# Patient Record
Sex: FEMALE | Race: WHITE | Employment: OTHER | URBAN - METROPOLITAN AREA
[De-identification: names, ages, dates, MRNs, and addresses within clinical notes are randomized per-mention and may not be internally consistent; named-entity substitution may affect disease eponyms.]

---

## 2017-06-22 ENCOUNTER — ALLSCRIPTS OFFICE VISIT (OUTPATIENT)
Dept: OTHER | Facility: OTHER | Age: 57
End: 2017-06-22

## 2017-07-03 ENCOUNTER — GENERIC CONVERSION - ENCOUNTER (OUTPATIENT)
Dept: OTHER | Facility: OTHER | Age: 57
End: 2017-07-03

## 2017-07-03 ENCOUNTER — LAB CONVERSION - ENCOUNTER (OUTPATIENT)
Dept: OTHER | Facility: OTHER | Age: 57
End: 2017-07-03

## 2017-07-03 LAB
25(OH)D3 SERPL-MCNC: 33 NG/ML (ref 30–100)
A/G RATIO (HISTORICAL): 1.5 (CALC) (ref 1–2.5)
ALBUMIN SERPL BCP-MCNC: 4 G/DL (ref 3.6–5.1)
ALP SERPL-CCNC: 60 U/L (ref 33–130)
ALT SERPL W P-5'-P-CCNC: 13 U/L (ref 6–29)
AST SERPL W P-5'-P-CCNC: 16 U/L (ref 10–35)
BASOPHILS # BLD AUTO: 0.6 %
BASOPHILS # BLD AUTO: 41 CELLS/UL (ref 0–200)
BILIRUB SERPL-MCNC: 0.5 MG/DL (ref 0.2–1.2)
BUN SERPL-MCNC: 22 MG/DL (ref 7–25)
BUN/CREA RATIO (HISTORICAL): ABNORMAL (CALC) (ref 6–22)
CALCIUM SERPL-MCNC: 9 MG/DL (ref 8.6–10.4)
CHLORIDE SERPL-SCNC: 102 MMOL/L (ref 98–110)
CHOLEST SERPL-MCNC: 180 MG/DL (ref 125–200)
CHOLEST/HDLC SERPL: 3.1 (CALC)
CO2 SERPL-SCNC: 25 MMOL/L (ref 20–31)
CREAT SERPL-MCNC: 0.95 MG/DL (ref 0.5–1.05)
DEPRECATED RDW RBC AUTO: 15.2 % (ref 11–15)
EGFR AFRICAN AMERICAN (HISTORICAL): 78 ML/MIN/1.73M2
EGFR-AMERICAN CALC (HISTORICAL): 67 ML/MIN/1.73M2
EOSINOPHIL # BLD AUTO: 13.6 %
EOSINOPHIL # BLD AUTO: 925 CELLS/UL (ref 15–500)
FSH (HISTORICAL): 123.9 MIU/ML
GAMMA GLOBULIN (HISTORICAL): 2.7 G/DL (CALC) (ref 1.9–3.7)
GLUCOSE (HISTORICAL): 100 MG/DL (ref 65–99)
HCT VFR BLD AUTO: 36.4 % (ref 35–45)
HDLC SERPL-MCNC: 58 MG/DL
HGB BLD-MCNC: 11.9 G/DL (ref 11.7–15.5)
LDL CHOLESTEROL (HISTORICAL): 107 MG/DL (CALC)
LUTEINIZING HORMONE (HISTORICAL): 69.4 MIU/ML
LYME IGG/IGM AB (HISTORICAL): <0.9 INDEX
LYMPHOCYTES # BLD AUTO: 2258 CELLS/UL (ref 850–3900)
LYMPHOCYTES # BLD AUTO: 33.2 %
MCH RBC QN AUTO: 28.7 PG (ref 27–33)
MCHC RBC AUTO-ENTMCNC: 32.6 G/DL (ref 32–36)
MCV RBC AUTO: 87.9 FL (ref 80–100)
MONOCYTES # BLD AUTO: 449 CELLS/UL (ref 200–950)
MONOCYTES (HISTORICAL): 6.6 %
NEUTROPHILS # BLD AUTO: 3128 CELLS/UL (ref 1500–7800)
NEUTROPHILS # BLD AUTO: 46 %
NON-HDL-CHOL (CHOL-HDL) (HISTORICAL): 122 MG/DL (CALC)
PLATELET # BLD AUTO: 280 THOUSAND/UL (ref 140–400)
PMV BLD AUTO: 8.1 FL (ref 7.5–12.5)
POTASSIUM SERPL-SCNC: 4.2 MMOL/L (ref 3.5–5.3)
RBC # BLD AUTO: 4.15 MILLION/UL (ref 3.8–5.1)
SODIUM SERPL-SCNC: 139 MMOL/L (ref 135–146)
T4 FREE SERPL-MCNC: 0.9 NG/DL (ref 0.8–1.8)
TOTAL PROTEIN (HISTORICAL): 6.7 G/DL (ref 6.1–8.1)
TRIGL SERPL-MCNC: 75 MG/DL
TSH SERPL DL<=0.05 MIU/L-ACNC: 9.16 MIU/L (ref 0.4–4.5)
WBC # BLD AUTO: 6.8 THOUSAND/UL (ref 3.8–10.8)

## 2017-12-14 ENCOUNTER — ALLSCRIPTS OFFICE VISIT (OUTPATIENT)
Dept: OTHER | Facility: OTHER | Age: 57
End: 2017-12-14

## 2017-12-15 NOTE — PROGRESS NOTES
Assessment  1  Hypothyroidism (244 9) (E03 9)   2  Adult BMI 27 0-27 9 kg/sq m (V85 23) (Z68 27)   3  Thumb pain, left (729 5) (O73 350)    Plan  Hypothyroidism    · Levothyroxine Sodium 25 MCG Oral Tablet (Synthroid); TAKE ONE TABLET(S)DAILY take on anempty stomach   · (1) TSH WITH FT4 REFLEX; Status:Active; Requested for:42Fbg5915; Thumb pain, left    · 1 - Ana María Abreu MD  (Orthopedic Surgery) Co-Management  *  Status: Active Requested for: [id number]  Care Summary provided  : Yes    Discussion/Summary    Check labs for hypothyroidism control  Referred to ortho for thumb issue  Follow up yearly or sooner PRN  Chief Complaint  pt present for follow up on medication  ac/cma      History of Present Illness  Here for hypothyroidism follow up  Feels well, no fatigue, weight gain, or other symptoms  Having some L thumb pain and inability to flex  No injury  Noted suddenly about 2 weeks ago while in bed  The patient is being seen for follow-up of hypothyroidism of undetermined etiology  The patient reports doing well  She has had no significant interval events  The patient is currently asymptomatic  Medications:  the patient is adherent to her medication regimen, but-- she denies medication side effects  Disease management:  the patient is doing well with her goals  Due for: thyroid stimulating hormone and free T4  Review of Systems   Constitutional: No fever, no chills, feels well, no tiredness, no recent weight gain or weight loss  Cardiovascular: No complaints of slow heart rate, no fast heart rate, no chest pain, no palpitations, no leg claudication, no lower extremity edema  Respiratory: No complaints of shortness of breath, no wheezing, no cough, no SOB on exertion, no orthopnea, no PND  Musculoskeletal: as noted in HPI  Active Problems  1  Elevated glucose (790 29) (R73 09)   2  Fatigue (780 79) (R53 83)   3  H/o Lyme disease (V12 09) (Z86 19)   4   Hashimoto's thyroiditis (245 2) (E06 3)   5  Hypothyroidism (244 9) (E03 9)   6  Lipid screening (V77 91) (Z13 220)   7  Menopausal disorder (627 9) (N95 9)   8  Muscle pain (729 1) (M79 1)   9  Need for tetanus booster (V03 7) (Z23)   10  Pulmonary embolism (415 19) (I26 99)   11  Screening for diabetes mellitus (DM) (V77 1) (Z13 1)   12  Visit for screening mammogram (V76 12) (Z12 31)   13  Vitamin D deficiency (268 9) (E55 9)    Past Medical History  1  Pulmonary embolism (415 19) (I26 99)    The active problems and past medical history were reviewed and updated today  Surgical History  1  History of Gynecologic Serv Endometrial Cryoablation W/ Ultrason Guid   2  History of Sinus Surgery    The surgical history was reviewed and updated today  Family History  Mother    1  Family history of Diabetes  Brother    2  Family history of juvenile rheumatoid arthritis (V17 7) (Z82 61)   3  Family history of Parkinson's disease (V17 2) (Z82 0)    The family history was reviewed and updated today  Social History   · Former smoker (O77 00) (M25 805)  The social history was reviewed and updated today  The social history was reviewed and is unchanged  Current Meds   1  Levothyroxine Sodium 25 MCG Oral Tablet; TAKE ONE TABLET(S) DAILY take on anempty stomach; Therapy: 06PPT3014 to (Evaluate:66Xwr6640)  Requested for: 66NJZ9007; Last Rx:16Kbo5222 Ordered    The medication list was reviewed and updated today  Allergies  1  Percocet TABS    Vitals  Vital Signs    Recorded: 68Ejc4033 12:04PM   Temperature 97 1 F   Heart Rate 76   Respiration 16   Systolic 232   Diastolic 68   Height 5 ft 3 in   Weight 157 lb    BMI Calculated 27 81   BSA Calculated 1 74     Physical Exam   Constitutional  General appearance: No acute distress, well appearing and well nourished  Pulmonary  Respiratory effort: No increased work of breathing or signs of respiratory distress  Auscultation of lungs: Clear to auscultation     Cardiovascular  Auscultation of heart: Normal rate and rhythm, normal S1 and S2, without murmurs  Examination of extremities for edema and/or varicosities: Normal    Abdomen  Abdomen: Non-tender, no masses  Liver and spleen: No hepatomegaly or splenomegaly  Lymphatic  Palpation of lymph nodes in neck: No lymphadenopathy  Musculoskeletal  Inspection/palpation of joints, bones, and muscles: Normal  -- L thumb able to passively flex but no active ability to flex    Psychiatric  Orientation to person, place, and time: Normal    Mood and affect: Normal          Signatures   Electronically signed by : Angelica Snellen, M D ; Dec 14 2017  1:09PM EST                       (Author)

## 2018-01-10 NOTE — RESULT NOTES
Verified Results  (1) HEMOGLOBIN A1C 29Jan2016 07:31AM Alba Briones Stage     Test Name Result Flag Reference   Hemoglobin A1c 6 6 % H 4 8-5 6   Pre-diabetes: 5 7 - 6 4           Diabetes: >6 4           Glycemic control for adults with diabetes: <7 0       Discussion/Summary   Glucose is high    Please schedule an appointment to discuss  - Dr Rojelio Rosales

## 2018-01-12 VITALS
DIASTOLIC BLOOD PRESSURE: 70 MMHG | SYSTOLIC BLOOD PRESSURE: 110 MMHG | HEIGHT: 63 IN | TEMPERATURE: 98.2 F | BODY MASS INDEX: 26.75 KG/M2 | RESPIRATION RATE: 20 BRPM | HEART RATE: 82 BPM | WEIGHT: 151 LBS

## 2018-01-13 NOTE — RESULT NOTES
Verified Results  [facility]) TSH+Free T4 [id number] 07:31AM Marie Bhandari     Test Name Result Flag Reference   TSH 2 060 uIU/mL  0 450-4 500   T4,Free(Direct) 1 25 ng/dL  0 82-1 77     (LC) Vitamin D, 25-Hydroxy, Total [id number] 07:31AM Abdi Briones     Test Name Result Flag Reference   Vitamin D, 25-Hydroxy, Serum 34 ng/mL     Reference Range: All Ages: Target levels 30 - 100     (LC) Comp  Metabolic Panel (13) [id number] 07:31AM Abdi Briones     Test Name Result Flag Reference   Glucose, Serum 119 mg/dL H 65-99   BUN 18 mg/dL  6-24   Creatinine, Serum 0 97 mg/dL  0 57-1 00   eGFR If NonAfricn Am 66 mL/min/1 73  >59   eGFR If Africn Am 77 mL/min/1 73  >59   BUN/Creatinine Ratio 19  9-23   Sodium, Serum 140 mmol/L  134-144   Potassium, Serum 4 2 mmol/L  3 5-5 2   Chloride, Serum 101 mmol/L     Carbon Dioxide, Total 23 mmol/L  18-29   Calcium, Serum 9 6 mg/dL  8 7-10 2   Protein, Total, Serum 6 9 g/dL  6 0-8 5   Albumin, Serum 4 4 g/dL  3 5-5 5   Globulin, Total 2 5 g/dL  1 5-4 5   A/G Ratio 1 8  1 1-2 5   Bilirubin, Total 0 4 mg/dL  0 0-1 2   Alkaline Phosphatase, S 53 IU/L     AST (SGOT) 19 IU/L  0-40     () CBC/Diff Ambiguous Default [id number] 07:31AM Marie Bhandari     Test Name Result Flag Reference   WBC 5 8 x10E3/uL  3 4-10 8   RBC 4 60 x10E6/uL  3 77-5 28   Hemoglobin 13 7 g/dL  11 1-15 9   Hematocrit 40 6 %  34 0-46  6   MCV 88 fL  79-97   MCH 29 8 pg  26 6-33 0   MCHC 33 7 g/dL  31 5-35 7   RDW 13 4 %  12 3-15 4   Platelets 329 P11J6/BV  150-379   Neutrophils 48 %     Lymphs 33 %     Monocytes 7 %     Eos 11 %     Basos 1 %     Neutrophils (Absolute) 2 8 x10E3/uL  1 4-7 0   Lymphs (Absolute) 1 9 x10E3/uL  0 7-3 1   Monocytes(Absolute) 0 4 x10E3/uL  0 1-0 9   Eos (Absolute) 0 7 x10E3/uL H 0 0-0 4   Baso (Absolute) 0 1 x10E3/uL  0 0-0 2   Immature Granulocytes 0 %     Immature Grans (Abs) 0 0 x10E3/uL  0 0-0 1     (LC) Lipid Panel [id number] 07:31AM Marie Bhandari     Test Name Result Flag Reference   Cholesterol, Total 173 mg/dL  100-199   Triglycerides 64 mg/dL  0-149   HDL Cholesterol 70 mg/dL  >39   According to ATP-III Guidelines, HDL-C >59 mg/dL is considered a  negative risk factor for CHD  VLDL Cholesterol Cong 13 mg/dL  5-40   LDL Cholesterol Calc 90 mg/dL  0-99     [facility]) Cardiovascular Risk Assessment 29Jan2016 07:31AM Remacarena Terrence     Test Name Result Flag Reference   Interpretation Note     Medical Director's Note: Patient First Name has been corrected on  1/30/2016, was [facility] and now is S[facility]  Please review this report  in its entirety, since changes to patient demographics may affect  result interpretation(s) and/or treatment/follow-up suggestions  Supplement report is available  PDF Image   Plan  Elevated glucose    · (LC) Hemoglobin A1c; Status:Active;  Requested for:26Equ5942;

## 2018-01-15 NOTE — PROGRESS NOTES
Assessment    1  Encounter for preventive health examination (V70 0) (Z00 00)    Plan  Fatigue    · (LC) CBC/Diff Ambiguous Default; Status:Active; Requested FMR:[id number];   Hashimoto's thyroiditis    · (LC) TSH+Free T4; Status:Active; Requested SRP:[id number]; Health Maintenance    · (LC) Comp  Metabolic Panel (13); Status:Active; Requested BCT:[id number];    · SNELLEN VISION- POC; Status:Temporary Deferral - Patient reports item recently done;   Lipid screening    · ([facility]) Lipid Panel; Status:Active; Requested KWAME:95UOG2360;   Need for tetanus booster    · Adacel 5-2-15 5 LF-MCG/0 5 Intramuscular Suspension; 0 5 ml IM x 1 dose; To Be Done: 21Jan2016  Vitamin D deficiency    · (LC) Vitamin D, 25-Hydroxy, Total; Status:Active; Requested DTP:14NVD6818;     Discussion/Summary  health maintenance visit Currently, she eats a healthy diet and has an adequate exercise regimen  cervical cancer screening is current cervical cancer screening is needed every three years cervical cancer screening is managed by gyn Breast cancer screening: the risks and benefits of breast cancer screening were discussed and mammogram is current  Colorectal cancer screening: colorectal cancer screening is current  The immunizations will be given as outlined in the orders  Advice and education were given regarding nutrition, aerobic exercise, calcium supplements and vitamin D supplements  Patient discussion: discussed with the patient  Chief Complaint  CPE - needs a Tdap because her daughter is expecting a baby in 4 weeks  Patient declined a vision screen - states she was just to her eye dr on Monday  acgCMA      History of Present Illness  HM, Adult Female:   General Health: She has regular dental visits  She denies vision problems  She denies hearing loss  Immunizations status: not up to date  Lifestyle:  She consumes a diverse and healthy diet  She does not have any weight concerns  She exercises regularly   (walks at least 30 minutes daily)   She does not use tobacco  She denies alcohol use  She denies drug use  Reproductive health: the patient is postmenopausal    Screening: cancer screening reviewed and updated  metabolic screening reviewed and updated  risk screening reviewed and updated  Review of Systems    Constitutional: No fever, no chills, feels well, no tiredness, no recent weight gain or weight loss  Eyes: No complaints of eye pain, no red eyes, no eyesight problems, no discharge, no dry eyes, no itching of eyes  ENT: no complaints of earache, no loss of hearing, no nose bleeds, no nasal discharge, no sore throat, no hoarseness  Cardiovascular: No complaints of slow heart rate, no fast heart rate, no chest pain, no palpitations, no leg claudication, no lower extremity edema  Respiratory: No complaints of shortness of breath, no wheezing, no cough, no SOB on exertion, no orthopnea, no PND  Gastrointestinal: No complaints of abdominal pain, no constipation, no nausea or vomiting, no diarrhea, no bloody stools  Genitourinary: No complaints of dysuria, no incontinence, no pelvic pain, no dysmenorrhea, no vaginal discharge or bleeding  Musculoskeletal: No complaints of arthralgias, no myalgias, no joint swelling or stiffness, no limb pain or swelling  Integumentary: No complaints of skin rash or lesions, no itching, no skin wounds, no breast pain or lump  Neurological: No complaints of headache, no confusion, no convulsions, no numbness, no dizziness or fainting, no tingling, no limb weakness, no difficulty walking  Psychiatric: Not suicidal, no sleep disturbance, no anxiety or depression, no change in personality, no emotional problems  Endocrine: No complaints of proptosis, no hot flashes, no muscle weakness, no deepening of the voice, no feelings of weakness  Hematologic/Lymphatic: No complaints of swollen glands, no swollen glands in the neck, does not bleed easily, does not bruise easily        Active Problems    1  Fatigue (780 79) (R53 83)   2  H/O Lyme disease (V12 09) (Z86 19)   3  Hashimoto's thyroiditis (245 2) (E06 3)   4  Joint pain (719 40) (M25 50)   5  Muscle pain (729 1) (M79 1)   6  Neck pain (723 1) (M54 2)   7  Pulmonary embolism (415 19) (I26 99)   8  Screening for diabetes mellitus (DM) (V77 1) (Z13 1)   9  Visit for screening mammogram (V76 12) (Z12 31)   10  Vitamin D deficiency (268 9) (E55 9)    Past Medical History    · Pulmonary embolism (415 19) (I26 99)    Surgical History    · History of Gynecologic Serv Endometrial Cryoablation W/ Ultrason Guid   · History of Sinus Surgery    Family History    · Family history of Diabetes    · Family history of juvenile rheumatoid arthritis (V17 7) (Z82 61)   · Family history of Parkinson's disease (V17 2) (Z82 0)    Social History    · Former smoker (V15 82) (G78 029)    Current Meds   1  CoQ-10 200 MG Oral Capsule; TAKE 1 CAPSULE Daily Recorded    Allergies    1  Percocet TABS    Vitals   Recorded: 21Jan2016 04:05PM   Temperature 98 F   Heart Rate 84   Respiration 16   Systolic 770   Diastolic 70   Height 5 ft 3 in   Weight 155 lb    BMI Calculated 27 46   BSA Calculated 1 74     Physical Exam    Constitutional   General appearance: No acute distress, well appearing and well nourished  Eyes   Conjunctiva and lids: No swelling, erythema or discharge  Pupils and irises: Equal, round, reactive to light  Ears, Nose, Mouth, and Throat   External inspection of ears and nose: Normal     Otoscopic examination: Tympanic membranes translucent with normal light reflex  Canals patent without erythema  Hearing: Normal     Nasal mucosa, septum, and turbinates: Normal without edema or erythema  Lips, teeth, and gums: Normal, good dentition  Oropharynx: Normal with no erythema, edema, exudate or lesions  Neck   Neck: Supple, symmetric, trachea midline, no masses  Thyroid: Normal, no thyromegaly      Pulmonary   Respiratory effort: No increased work of breathing or signs of respiratory distress  Percussion of chest: Normal     Palpation of chest: Normal     Auscultation of lungs: Clear to auscultation  Cardiovascular   Palpation of heart: Normal PMI, no thrills  Auscultation of heart: Normal rate and rhythm, normal S1 and S2, no murmurs  Carotid pulses: 2+ bilaterally  Abdominal aorta: Normal     Femoral pulses: 2+ bilaterally  Pedal pulses: 2+ bilaterally  Examination of extremities for edema and/or varicosities: Normal     Abdomen   Abdomen: Non-tender, no masses  Liver and spleen: No hepatomegaly or splenomegaly  Examination for hernias: No hernia appreciated  Lymphatic   Palpation of lymph nodes in neck: No lymphadenopathy  Palpation of lymph nodes in axillae: No lymphadenopathy  Palpation of lymph nodes in groin: No lymphadenopathy  Palpation of lymph nodes in other areas: No lymphadenopathy  Musculoskeletal   Gait and station: Normal     Digits and nails: Normal without clubbing or cyanosis  Joints, bones, and muscles: Normal     Range of motion: Normal     Stability: Normal     Muscle strength/tone: Normal     Skin   Skin and subcutaneous tissue: Normal without rashes or lesions  Palpation of skin and subcutaneous tissue: Normal turgor  Neurologic   Cranial nerves: Cranial nerves II-XII intact  Reflexes: 2+ and symmetric  Sensation: No sensory loss  Psychiatric   Judgment and insight: Normal     Orientation to person, place, and time: Normal     Recent and remote memory: Intact  Mood and affect: Normal        Procedure    Procedure: Visual Acuity Test   pt declines - she was to her eye dr on Monday of this week  acgCMA  Indication: routine screening        Signatures   Electronically signed by : TIM Paris ; Jan 21 2016  4:31PM EST                       (Author)

## 2018-01-16 NOTE — RESULT NOTES
Verified Results  (1) TSH WITH FT4 REFLEX [id number] 09:15AM Corinan Briones     Test Name Result Flag Reference   TSH 3 560 uIU/mL  0 450-4 500     (1) VITAMIN D 25-HYDROXY [id number] 09:15AM Corinna Briones     Test Name Result Flag Reference   Vitamin D, 25-Hydroxy 36 5 ng/mL  30 0-100 0   Vitamin D deficiency has been defined by the [address] practice guideline as a  level of serum 25-OH vitamin D less than 20 ng/mL (1,2)  The Endocrine Society went on to further define vitamin D  insufficiency as a level between 21 and 29 ng/mL (2)  1  IOM ([employer])  2010  Dietary reference     intakes for calcium and D  [facility]: The     [employer]  2  Zunilda MF, Lennox GERARD, Ghislaine HO, et al      Evaluation, treatment, and prevention of vitamin D     deficiency: an Endocrine Society clinical practice     guideline  [facility]  2011 Jul; 96(7):1911-30  (1) CBC/PLT/DIFF [id number] 09:15AM Corinna Briones     Test Name Result Flag Reference   Lymphs (Absolute) 2 2 x10E3/uL  0 7-3 1   Monocytes(Absolute) 0 5 x10E3/uL  0 1-0 9   Eos (Absolute) 1 1 x10E3/uL H 0 0-0 4   Baso (Absolute) 0 0 x10E3/uL  0 0-0 2   Immature Granulocytes 0 %     Immature Grans (Abs) 0 0 x10E3/uL  0 0-0 1   Neutrophils 43 %     Lymphs 32 %     Monocytes 8 %     Eos 16 %     Basos 1 %     Neutrophils (Absolute) 3 0 x10E3/uL  1 4-7 0   Hematocrit 39 4 %  34 0-46  6   MCV 88 fL  79-97   MCH 29 1 pg  26 6-33 0   MCHC 33 0 g/dL  31 5-35 7   RDW 13 6 %  12 3-15 4   Platelets 737 R03S4/HN  150-379   WBC 6 9 x10E3/uL  3 4-10 8   RBC 4 46 x10E6/uL  3 77-5 28   Hemoglobin 13 0 g/dL  11 1-15 9     (1) COMPREHENSIVE METABOLIC PANEL [id number] 25:68HI Corinna Briones     Test Name Result Flag Reference   Glucose, Serum 112 mg/dL H 65-99   BUN 16 mg/dL  6-24   Creatinine, Serum 0 96 mg/dL  0 57-1 00   eGFR If NonAfricn Am 67 mL/min/1 73  >59   eGFR If Africn Am 77 mL/min/1 73  >59 BUN/Creatinine Ratio 17  9-23   ALT (SGPT) 13 IU/L  0-32   Albumin, Serum 4 2 g/dL  3 5-5 5   Globulin, Total 2 7 g/dL  1 5-4 5   A/G Ratio 1 6  1 1-2 5   Bilirubin, Total 0 4 mg/dL  0 0-1 2   Alkaline Phosphatase, S 61 IU/L     AST (SGOT) 18 IU/L  0-40   Sodium, Serum 142 mmol/L  134-144   Potassium, Serum 4 3 mmol/L  3 5-5 2   Chloride, Serum 103 mmol/L     Carbon Dioxide, Total 25 mmol/L  18-29   Calcium, Serum 9 1 mg/dL  8 7-10 2   Protein, Total, Serum 6 9 g/dL  6 0-8 5     (1) LIPID PANEL, FASTING 28Sep2016 09:15AM Abdi Briones     Test Name Result Flag Reference   Cholesterol, Total 181 mg/dL  100-199   Triglycerides 89 mg/dL  0-149   HDL Cholesterol 69 mg/dL  >39   According to ATP-III Guidelines, HDL-C >59 mg/dL is considered a  negative risk factor for CHD  VLDL Cholesterol Cong 18 mg/dL  5-40   LDL Cholesterol Calc 94 mg/dL  0-99   T  Chol/HDL Ratio 2 6 ratio units  0 0-4 4   T  Chol/HDL Ratio                                                             Men  Women                                               1/2 Avg  Risk  3 4    3 3                                                   Avg Risk  5 0    4 4                                                2X Avg  Risk  9 6    7 1                                                3X Avg  Risk 23 4   11 0     (1) HEMOGLOBIN A1C 28Sep2016 09:15AM Abdi Briones     Test Name Result Flag Reference   Hemoglobin A1c 5 9 % H 4 8-5 6   Pre-diabetes: 5 7 - 6 4           Diabetes: >6 4           Glycemic control for adults with diabetes: <7 0     [facility]) Cardiovascular Risk Assessment 28Sep2016 09:15AM Marie Thony     Test Name Result Flag Reference   Interpretation Note     Supplement report is available  PDF Image          ([birth year][address] [facility]) Lyme Ab/Western Blot Reflex [id number] 09:15AM Marie Thony     Test Name Result Flag Reference   Lyme IgG/IgM Ab <0 91 ISR  0 00-0 90   Negative         <0 91                                                 Equivocal  0 91 - 1 09                                                 Positive         >1 09   Lyme Disease Ab, Quant, IgM 0 96 index H 0 00-0 79   Negative         <0 80                                                 Equivocal  0 80 - 1 19                                                 Positive         >1 19                  IgM levels may peak at 3-6 weeks post infection, then                  gradually decline  IgG P93 Ab  Absent     IgG P66 Ab  Present A    IgG P58 Ab  Absent     IgG P18 Ab  Absent     Lyme IgG WB Interp  Negative     Positive: 5 of the following                                                Borrelia-specific bands:                                                18,23,28,30,39,41,45,58,                                                66, and 93  Negative: No bands or banding                                                patterns which do not                                                meet positive criteria  IgM P41 Ab  Absent     IgM P39 Ab  Absent     IgM P23 Ab  Absent     Lyme IgM WB Interp  Negative     Note: An equivocal or positive EIA result followed by a negative  Western Blot result is considered NEGATIVE  An equivocal or positive  EIA result followed by a positive Western Blot is considered POSITIVE  by the CDC  Positive: 2 of the following bands: 23,39 or 41  Negative: No bands or banding patterns which do not meet positive  criteria  Criteria for positivity are those recommended by CDC/ASTPHLD   p23=Osp C, d34=vwzlmuxte  Note:  Sera from individuals with the following may cross react in the  Lyme Western Blot assays: other spirochetal diseases (periodontal  disease, leptospirosis, relapsing fever, yaws, and pinta);  connective autoimmune (Rheumatoid Arthritis and Systemic Lupus  Erythematosus and also individuals with Antinuclear Antibody);  other infections COFFEE [facility] Spotted Fever; Mayuri-Barr Virus,  and Cytomegalovirus)  IgG P45 Ab   Absent IgG P41 Ab  Absent     IgG P39 Ab  Absent     IgG P30 Ab  Absent     IgG P28 Ab  Absent     IgG P23 Ab   Absent         Discussion/Summary   Sugar is mildly elevated but other bloodwork, including lyme titer, is normal  - Dr Colleen Hammer

## 2018-01-23 VITALS
SYSTOLIC BLOOD PRESSURE: 112 MMHG | WEIGHT: 157 LBS | DIASTOLIC BLOOD PRESSURE: 68 MMHG | HEART RATE: 76 BPM | RESPIRATION RATE: 16 BRPM | HEIGHT: 63 IN | TEMPERATURE: 97.1 F | BODY MASS INDEX: 27.82 KG/M2

## 2018-02-01 LAB — TSH SERPL-ACNC: 3.1 MIU/L (ref 0.4–4.5)

## 2018-02-05 DIAGNOSIS — E03.9 HYPOTHYROIDISM, UNSPECIFIED TYPE: Primary | ICD-10-CM

## 2018-02-05 RX ORDER — LEVOTHYROXINE SODIUM 0.03 MG/1
25 TABLET ORAL DAILY
Qty: 90 TABLET | Refills: 3 | Status: SHIPPED | OUTPATIENT
Start: 2018-02-05 | End: 2019-01-08 | Stop reason: SDUPTHER

## 2018-02-05 RX ORDER — LEVOTHYROXINE SODIUM 0.03 MG/1
1 TABLET ORAL DAILY
COMMUNITY
Start: 2017-07-03 | End: 2018-02-05 | Stop reason: SDUPTHER

## 2018-02-05 NOTE — TELEPHONE ENCOUNTER
Alpesh Holcomb is calling regarding her blood work that she received in the mail  She has a couple of questions

## 2018-02-05 NOTE — TELEPHONE ENCOUNTER
Spoke with pt  She wanted to know if she should continue taking the same dose on her Levothyroxine  As per Dr Santiago Xie advise, pt aware to continue taking same dose  Refill request sent to you for mail order

## 2018-04-10 ENCOUNTER — OFFICE VISIT (OUTPATIENT)
Dept: FAMILY MEDICINE CLINIC | Facility: CLINIC | Age: 58
End: 2018-04-10
Payer: COMMERCIAL

## 2018-04-10 VITALS
TEMPERATURE: 97.4 F | DIASTOLIC BLOOD PRESSURE: 58 MMHG | RESPIRATION RATE: 16 BRPM | HEART RATE: 84 BPM | HEIGHT: 63 IN | WEIGHT: 161 LBS | SYSTOLIC BLOOD PRESSURE: 114 MMHG | BODY MASS INDEX: 28.53 KG/M2

## 2018-04-10 DIAGNOSIS — J06.9 VIRAL URI WITH COUGH: Primary | ICD-10-CM

## 2018-04-10 PROCEDURE — 3008F BODY MASS INDEX DOCD: CPT | Performed by: NURSE PRACTITIONER

## 2018-04-10 PROCEDURE — 99213 OFFICE O/P EST LOW 20 MIN: CPT | Performed by: NURSE PRACTITIONER

## 2018-04-10 RX ORDER — BENZONATATE 200 MG/1
200 CAPSULE ORAL 3 TIMES DAILY PRN
Qty: 30 CAPSULE | Refills: 0 | Status: SHIPPED | OUTPATIENT
Start: 2018-04-10 | End: 2019-03-14 | Stop reason: ALTCHOICE

## 2018-04-10 NOTE — PROGRESS NOTES
Assessment/Plan:    Problem List Items Addressed This Visit     None      Visit Diagnoses     Viral URI with cough    -  Primary    Relevant Medications    benzonatate (TESSALON) 200 MG capsule          Patient Instructions   Advised on supportive care  Follow up for fevers, worsening cough, or difficulty breathing  Return if symptoms worsen or fail to improve  Subjective:      Patient ID: Alicia Lanier is a 63 y o  female  Chief Complaint   Patient presents with    Cough     Started 10 days ago  Fever initially JMoyle LPN    Earache    Sinus Problem       10 days ago she developed body aches, sinus congestion, dry cough, fever, and red eyes with discharge  Overall symptoms have resolved  She has a residual dry, hacking cough  She is not taking anything OTC for this  She did not have a flu shot this season   developed similar symptoms 4 days ago  The following portions of the patient's history were reviewed and updated as appropriate: allergies, current medications, past family history, past medical history, past social history, past surgical history and problem list     Review of Systems   Constitutional: Negative for chills, fatigue and fever  HENT: Negative for congestion, ear pain, postnasal drip, rhinorrhea, sinus pressure and sore throat  Respiratory: Positive for cough  Negative for shortness of breath and wheezing  Cardiovascular: Negative for chest pain  Gastrointestinal: Negative for abdominal pain, diarrhea, nausea and vomiting  Musculoskeletal: Negative for arthralgias  Skin: Negative for rash  Neurological: Negative for headaches           Current Outpatient Prescriptions   Medication Sig Dispense Refill    levothyroxine 25 mcg tablet Take 1 tablet (25 mcg total) by mouth daily 90 tablet 3    benzonatate (TESSALON) 200 MG capsule Take 1 capsule (200 mg total) by mouth 3 (three) times a day as needed for cough 30 capsule 0     No current facility-administered medications for this visit  Objective:    /58   Pulse 84   Temp (!) 97 4 °F (36 3 °C)   Resp 16   Ht 5' 3" (1 6 m)   Wt 73 kg (161 lb)   BMI 28 52 kg/m²        Physical Exam   Constitutional: She appears well-developed and well-nourished  HENT:   Right Ear: Tympanic membrane, external ear and ear canal normal    Left Ear: Tympanic membrane, external ear and ear canal normal    Nose: No mucosal edema  Mouth/Throat: Oropharynx is clear and moist and mucous membranes are normal    Eyes: Conjunctivae are normal    Cardiovascular: Normal rate, regular rhythm and normal heart sounds  Pulmonary/Chest: Effort normal and breath sounds normal    Abdominal: Bowel sounds are normal  She exhibits no distension  There is no splenomegaly or hepatomegaly  There is no tenderness  Lymphadenopathy:        Right cervical: No superficial cervical adenopathy present  Left cervical: No superficial cervical adenopathy present  Skin: No rash noted  Psychiatric: She has a normal mood and affect  Nursing note and vitals reviewed               Giselle Valentine

## 2019-01-08 DIAGNOSIS — E03.9 HYPOTHYROIDISM, UNSPECIFIED TYPE: ICD-10-CM

## 2019-01-08 RX ORDER — LEVOTHYROXINE SODIUM 0.03 MG/1
TABLET ORAL
Qty: 30 TABLET | Refills: 0 | Status: SHIPPED | OUTPATIENT
Start: 2019-01-08 | End: 2019-05-15 | Stop reason: SDUPTHER

## 2019-01-11 NOTE — TELEPHONE ENCOUNTER
Left message on machine requesting a call back  Please let pt know that she will need an appointment and to get lab work done   Byron Khan, [address]

## 2019-01-17 ENCOUNTER — OFFICE VISIT (OUTPATIENT)
Dept: URGENT CARE | Facility: MEDICAL CENTER | Age: 59
End: 2019-01-17
Payer: COMMERCIAL

## 2019-01-17 VITALS
SYSTOLIC BLOOD PRESSURE: 139 MMHG | HEART RATE: 68 BPM | DIASTOLIC BLOOD PRESSURE: 72 MMHG | WEIGHT: 157 LBS | HEIGHT: 63 IN | TEMPERATURE: 98.4 F | RESPIRATION RATE: 16 BRPM | OXYGEN SATURATION: 99 % | BODY MASS INDEX: 27.82 KG/M2

## 2019-01-17 DIAGNOSIS — J06.9 ACUTE URI: Primary | ICD-10-CM

## 2019-01-17 DIAGNOSIS — R05.9 COUGH: ICD-10-CM

## 2019-01-17 PROCEDURE — G0382 LEV 3 HOSP TYPE B ED VISIT: HCPCS | Performed by: NURSE PRACTITIONER

## 2019-01-17 RX ORDER — BROMPHENIRAMINE MALEATE, PSEUDOEPHEDRINE HYDROCHLORIDE, AND DEXTROMETHORPHAN HYDROBROMIDE 2; 30; 10 MG/5ML; MG/5ML; MG/5ML
10 SYRUP ORAL 4 TIMES DAILY PRN
Qty: 120 ML | Refills: 0 | Status: SHIPPED | OUTPATIENT
Start: 2019-01-17 | End: 2019-03-14 | Stop reason: ALTCHOICE

## 2019-01-17 NOTE — PATIENT INSTRUCTIONS
May alternate Tylenol and Ibuprofen as needed  Encourage fluids and rest    Saline nasal spray as needed  Humidify bedroom  Salt water gargles  Chloraseptic spray and lozenges as needed  Bromfed as directed  F/U with PCP if symptoms persist/worsen or go to nearest emergency department if any signs of distress  Acute Cough   WHAT YOU NEED TO KNOW:   An acute cough can last up to 3 weeks  Common causes of an acute cough include a cold, allergies, or a lung infection  DISCHARGE INSTRUCTIONS:   Return to the emergency department if:   · You have trouble breathing or feel short of breath  · You cough up blood, or you see blood in your mucus  · You faint or feel weak or dizzy  · You have chest pain when you cough or take a deep breath  · You have new wheezing  Contact your healthcare provider if:   · You have a fever  · Your cough lasts longer than 4 weeks  · Your symptoms do not improve with treatment  · You have questions or concerns about your condition or care  Medicines:   · Medicines  may be needed to stop the cough, decrease swelling in your airways, or help open your airways  Medicine may also be given to help you cough up mucus  Ask your healthcare provider what over-the-counter medicines you can take  If you have an infection caused by bacteria, you may need antibiotics  · Take your medicine as directed  Contact your healthcare provider if you think your medicine is not helping or if you have side effects  Tell him or her if you are allergic to any medicine  Keep a list of the medicines, vitamins, and herbs you take  Include the amounts, and when and why you take them  Bring the list or the pill bottles to follow-up visits  Carry your medicine list with you in case of an emergency  Manage your symptoms:   · Do not smoke and stay away from others who smoke  Nicotine and other chemicals in cigarettes and cigars can cause lung damage and make your cough worse  Ask your healthcare provider for information if you currently smoke and need help to quit  E-cigarettes or smokeless tobacco still contain nicotine  Talk to your healthcare provider before you use these products  · Drink extra liquids as directed  Liquids will help thin and loosen mucus so you can cough it up  Liquids will also help prevent dehydration  Examples of good liquids to drink include water, fruit juice, and broth  Do not drink liquids that contain caffeine  Caffeine can increase your risk for dehydration  Ask your healthcare provider how much liquid to drink each day  · Rest as directed  Do not do activities that make your cough worse, such as exercise  · Use a humidifier or vaporizer  Use a cool mist humidifier or a vaporizer to increase air moisture in your home  This may make it easier for you to breathe and help decrease your cough  · Eat 2 to 5 mL of honey 2 times each day  Honey can help thin mucus and decrease your cough  · Use cough drops or lozenges  These can help decrease throat irritation and your cough  Follow up with your healthcare provider as directed:  Write down your questions so you remember to ask them during your visits  © 2017 [facility] Information is for End User's use only and may not be sold, redistributed or otherwise used for commercial purposes  All illustrations and images included in CareNotes® are the copyrighted property of A D A M , Inc  or Triston Rowley  The above information is an  only  It is not intended as medical advice for individual conditions or treatments  Talk to your doctor, nurse or pharmacist before following any medical regimen to see if it is safe and effective for you

## 2019-01-17 NOTE — PROGRESS NOTES
[facility]        NAME: Lucia Schultz is a 63 y o  female  : 1960    MRN: [id number]  DATE: 2019  TIME: 12:09 PM    Assessment and Plan   Acute URI [J06 9]  1  Acute URI     2  Cough  brompheniramine-pseudoephedrine-DM 30-2-10 MG/5ML syrup         Patient Instructions   Suspected to be residual cough from recent URI  Recommend symptomatic management  Discussed need for chest x ray if symptoms persisting  May alternate Tylenol and Ibuprofen as needed  Encourage fluids and rest    Saline nasal spray as needed  Humidify bedroom  Salt water gargles  Chloraseptic spray and lozenges as needed  Bromfed as directed  Follow up with PCP in 5-7 days  Proceed to  ER if symptoms worsen  Chief Complaint     Chief Complaint   Patient presents with    Cough     Patient has been sick since  with a dry cough  Cough is now productive  Denies fever  No recent travel outside [place]  History of Present Illness       Patient presents in office with cough since [place]  Had URI over  and cough did not resolve  No medical evaluation  All other symptoms have resolved  Has been taking Nyquil  Denies fevers, chills, sinus congestion, sore throat, chest pain, SOB, N/V/D  No history of asthma  Nonsmoker  No flu vaccine  Review of Systems   Review of Systems   Constitutional: Negative for chills and fever  HENT: Negative  Respiratory: Positive for cough  Negative for chest tightness, shortness of breath and wheezing  Cardiovascular: Negative  Gastrointestinal: Negative  Musculoskeletal: Negative for myalgias  Skin: Negative for rash           Current Medications       Current Outpatient Prescriptions:     levothyroxine 25 mcg tablet, TAKE 1 TABLET DAILY, Disp: 30 tablet, Rfl: 0    benzonatate (TESSALON) 200 MG capsule, Take 1 capsule (200 mg total) by mouth 3 (three) times a day as needed for cough (Patient not taking: Reported on 1/17/2019 ), Disp: 30 capsule, Rfl: 0    brompheniramine-pseudoephedrine-DM 30-2-10 MG/5ML syrup, Take 10 mL by mouth 4 (four) times a day as needed for cough, Disp: 120 mL, Rfl: 0    Current Allergies     Allergies as of 01/17/2019 - Reviewed 01/17/2019   Allergen Reaction Noted    Percocet  [oxycodone-acetaminophen] Other (See Comments) 10/23/2006            The following portions of the patient's history were reviewed and updated as appropriate: allergies, current medications, past family history, past medical history, past social history, past surgical history and problem list      Past Medical History:   Diagnosis Date    Disease of thyroid gland        Past Surgical History:   Procedure Laterality Date    APPENDECTOMY         No family history on file  Medications have been verified  Objective   /72   Pulse 68   Temp 98 4 °F (36 9 °C) (Tympanic)   Resp 16   Ht 5' 3" (1 6 m)   Wt 71 2 kg (157 lb)   SpO2 99%   BMI 27 81 kg/m²        Physical Exam     Physical Exam   Constitutional: She is oriented to person, place, and time  Vital signs are normal  She appears well-developed and well-nourished  She is cooperative  Non-toxic appearance  She does not have a sickly appearance  She does not appear ill  No distress  HENT:   Head: Normocephalic and atraumatic  Right Ear: Hearing, tympanic membrane, external ear and ear canal normal    Left Ear: Hearing, tympanic membrane, external ear and ear canal normal    Nose: Nose normal  No mucosal edema, rhinorrhea or sinus tenderness  Right sinus exhibits no maxillary sinus tenderness and no frontal sinus tenderness  Left sinus exhibits no maxillary sinus tenderness and no frontal sinus tenderness  Mouth/Throat: Uvula is midline, oropharynx is clear and moist and mucous membranes are normal  Normal dentition  No oropharyngeal exudate  Eyes: Pupils are equal, round, and reactive to light   Conjunctivae, EOM and lids are normal  Right eye exhibits no discharge  Left eye exhibits no discharge  Neck: Trachea normal and normal range of motion  No thyroid mass and no thyromegaly present  Cardiovascular: Normal rate, regular rhythm, S1 normal, S2 normal, normal heart sounds, intact distal pulses and normal pulses  Exam reveals no gallop and no friction rub  No murmur heard  Pulmonary/Chest: Effort normal and breath sounds normal  No respiratory distress  She has no wheezes  She has no rhonchi  She has no rales  She exhibits no tenderness  Musculoskeletal: Normal range of motion  She exhibits no edema  Lymphadenopathy:     She has no cervical adenopathy  Neurological: She is alert and oriented to person, place, and time  Skin: Skin is warm and dry  No rash noted  She is not diaphoretic  Psychiatric: She has a normal mood and affect  Her behavior is normal  Thought content normal    Nursing note and vitals reviewed  I have reviewed the student's history and physical, I have personally examined the patient and agree with the above stated findings and plan

## 2019-01-31 NOTE — TELEPHONE ENCOUNTER
Magno Rutledge would like the blood work mailed to her  She is trying to find another dr where she lives  It is a far drive for her to come here  She does want to get bloodwork done and see dr Vida Villalba until she finds dr where she lives  Please mail bloodwork for thyroid      Thank you

## 2019-02-15 ENCOUNTER — TELEPHONE (OUTPATIENT)
Dept: FAMILY MEDICINE CLINIC | Facility: CLINIC | Age: 59
End: 2019-02-15

## 2019-02-15 DIAGNOSIS — E06.3 HYPOTHYROIDISM DUE TO HASHIMOTO'S THYROIDITIS: Primary | ICD-10-CM

## 2019-02-15 DIAGNOSIS — R53.83 FATIGUE, UNSPECIFIED TYPE: ICD-10-CM

## 2019-02-15 DIAGNOSIS — E55.9 VITAMIN D DEFICIENCY: ICD-10-CM

## 2019-02-15 DIAGNOSIS — E06.3 HASHIMOTO'S THYROIDITIS: ICD-10-CM

## 2019-02-15 DIAGNOSIS — E03.8 HYPOTHYROIDISM DUE TO HASHIMOTO'S THYROIDITIS: Primary | ICD-10-CM

## 2019-02-15 NOTE — TELEPHONE ENCOUNTER
Dr Ruthie Hogan-    Patient called and was looking for the bloodwork you wanted her to get  (see previous refill encounter)  Can you please order that blood work for her and she is requesting if you can add a LYME test on there  She states she knows the symptoms all too well    Please MAIL lab work when ready    Patient stated she will make appointment as soon as she does lab work  Thank you!

## 2019-02-25 ENCOUNTER — TELEPHONE (OUTPATIENT)
Dept: FAMILY MEDICINE CLINIC | Facility: CLINIC | Age: 59
End: 2019-02-25

## 2019-02-25 NOTE — TELEPHONE ENCOUNTER
I spoke with pt, let her know that her lab work results are okay  And earliest I could get pt in with you was 3/14/19  Pt states she is surprised her labs came back okay, she is still very dizzy, lightheaded, and states she has had a headache for the past month  Pt states she has been taking advil with no relief  Pt states if she starts to feel any worse or does not get better is will just go to the ER  Please advise   Regla Woodward

## 2019-02-25 NOTE — TELEPHONE ENCOUNTER
Spoke with Pt, stated  can not come in this week until Friday   Pt stated will call back on Thursday to let let us know how she is feeling and to schedule an appointment if needed with a NP     Pt is aware to go to ED if symptoms worsen  Celestino Martinez MA

## 2019-02-25 NOTE — TELEPHONE ENCOUNTER
----- Message from Wilian Wyatt MD sent at 2/25/2019  2:06 PM EST -----  Please have patient make appointment to discuss  Labs are okay but overdue for a visit

## 2019-02-26 LAB
25(OH)D3 SERPL-MCNC: 33 NG/ML (ref 30–100)
ALBUMIN SERPL-MCNC: 4 G/DL (ref 3.6–5.1)
ALBUMIN/GLOB SERPL: 1.4 (CALC) (ref 1–2.5)
ALP SERPL-CCNC: 62 U/L (ref 33–130)
ALT SERPL-CCNC: 21 U/L (ref 6–29)
AST SERPL-CCNC: 22 U/L (ref 10–35)
B BURGDOR AB SER QL IA: 1.08 INDEX
B BURGDOR IGG SER QL IB: NEGATIVE
B BURGDOR IGM SER QL IB: NEGATIVE
B BURGDOR18KD IGG SER QL IB: ABNORMAL
B BURGDOR23KD IGG SER QL IB: ABNORMAL
B BURGDOR23KD IGM SER QL IB: ABNORMAL
B BURGDOR28KD IGG SER QL IB: ABNORMAL
B BURGDOR30KD IGG SER QL IB: ABNORMAL
B BURGDOR39KD IGG SER QL IB: ABNORMAL
B BURGDOR39KD IGM SER QL IB: ABNORMAL
B BURGDOR41KD IGG SER QL IB: ABNORMAL
B BURGDOR41KD IGM SER QL IB: ABNORMAL
B BURGDOR45KD IGG SER QL IB: ABNORMAL
B BURGDOR58KD IGG SER QL IB: ABNORMAL
B BURGDOR66KD IGG SER QL IB: REACTIVE
B BURGDOR93KD IGG SER QL IB: ABNORMAL
BASOPHILS # BLD AUTO: 42 CELLS/UL (ref 0–200)
BASOPHILS NFR BLD AUTO: 0.8 %
BILIRUB SERPL-MCNC: 0.3 MG/DL (ref 0.2–1.2)
BUN SERPL-MCNC: 22 MG/DL (ref 7–25)
BUN/CREAT SERPL: NORMAL (CALC) (ref 6–22)
CALCIUM SERPL-MCNC: 8.7 MG/DL (ref 8.6–10.4)
CHLORIDE SERPL-SCNC: 104 MMOL/L (ref 98–110)
CO2 SERPL-SCNC: 28 MMOL/L (ref 20–32)
CREAT SERPL-MCNC: 0.95 MG/DL (ref 0.5–1.05)
EOSINOPHIL # BLD AUTO: 686 CELLS/UL (ref 15–500)
EOSINOPHIL NFR BLD AUTO: 13.2 %
ERYTHROCYTE [DISTWIDTH] IN BLOOD BY AUTOMATED COUNT: 13.3 % (ref 11–15)
GLOBULIN SER CALC-MCNC: 2.8 G/DL (CALC) (ref 1.9–3.7)
GLUCOSE SERPL-MCNC: 91 MG/DL (ref 65–99)
HCT VFR BLD AUTO: 39 % (ref 35–45)
HGB BLD-MCNC: 13.2 G/DL (ref 11.7–15.5)
LYMPHOCYTES # BLD AUTO: 1700 CELLS/UL (ref 850–3900)
LYMPHOCYTES NFR BLD AUTO: 32.7 %
MCH RBC QN AUTO: 28.9 PG (ref 27–33)
MCHC RBC AUTO-ENTMCNC: 33.8 G/DL (ref 32–36)
MCV RBC AUTO: 85.3 FL (ref 80–100)
MONOCYTES # BLD AUTO: 562 CELLS/UL (ref 200–950)
MONOCYTES NFR BLD AUTO: 10.8 %
NEUTROPHILS # BLD AUTO: 2210 CELLS/UL (ref 1500–7800)
NEUTROPHILS NFR BLD AUTO: 42.5 %
PLATELET # BLD AUTO: 248 THOUSAND/UL (ref 140–400)
PMV BLD REES-ECKER: 10.1 FL (ref 7.5–12.5)
POTASSIUM SERPL-SCNC: 4.1 MMOL/L (ref 3.5–5.3)
PROT SERPL-MCNC: 6.8 G/DL (ref 6.1–8.1)
RBC # BLD AUTO: 4.57 MILLION/UL (ref 3.8–5.1)
SL AMB EGFR AFRICAN AMERICAN: 77 ML/MIN/1.73M2
SL AMB EGFR NON AFRICAN AMERICAN: 66 ML/MIN/1.73M2
SODIUM SERPL-SCNC: 139 MMOL/L (ref 135–146)
TSH SERPL-ACNC: 2.87 MIU/L (ref 0.4–4.5)
WBC # BLD AUTO: 5.2 THOUSAND/UL (ref 3.8–10.8)

## 2019-03-13 NOTE — PROGRESS NOTES
Subjective:      Patient ID: Linden Reid is a 63 y o  female  Chief Complaint   Patient presents with    Follow-up    Results       She is here for follow up of hypothyroidism  Had been having some stress related to a recent move but this is improved  Feels well  Reviewed bloodwork with her  The following portions of the patient's history were reviewed and updated as appropriate: allergies, current medications, past family history, past medical history, past social history, past surgical history and problem list     Review of Systems   Constitutional: Negative  Respiratory: Negative  Cardiovascular: Negative  Current Outpatient Medications   Medication Sig Dispense Refill    levothyroxine 25 mcg tablet TAKE 1 TABLET DAILY 30 tablet 0     No current facility-administered medications for this visit  Objective:    /60   Pulse 76   Temp 98 3 °F (36 8 °C)   Resp 16   Ht 5' 3" (1 6 m)   Wt 72 1 kg (159 lb)   BMI 28 17 kg/m²        Physical Exam   Constitutional: She appears well-developed and well-nourished  Eyes: Conjunctivae are normal    Neck: Neck supple  No JVD present  No thyromegaly present  Cardiovascular: Normal rate, regular rhythm, normal heart sounds and intact distal pulses  Exam reveals no gallop and no friction rub  No murmur heard  Pulmonary/Chest: Effort normal and breath sounds normal  She has no wheezes  She has no rales  Abdominal: Soft  Bowel sounds are normal  She exhibits no distension  There is no tenderness  Musculoskeletal: She exhibits no edema  Assessment/Plan:    Hypothyroidism  Stable and clinically euthyroid  Continue levothyroxine at current dose  Reviewed labs with patient  Diagnoses and all orders for this visit:    Hypothyroidism due to Hashimoto's thyroiditis    Screening breast examination  -     Mammo screening bilateral w cad; Future          Return in about 1 year (around 3/14/2020)       BMI Counseling: Body mass index is 28 17 kg/m²  Discussed the patient's BMI with her  The BMI is above average  BMI counseling and education was provided to the patient  Nutrition recommendations include reducing portion sizes and decreasing overall calorie intake  Exercise recommendations include moderate aerobic physical activity for 150 minutes/week        Damián Silvestre MD

## 2019-03-14 ENCOUNTER — OFFICE VISIT (OUTPATIENT)
Dept: FAMILY MEDICINE CLINIC | Facility: CLINIC | Age: 59
End: 2019-03-14
Payer: COMMERCIAL

## 2019-03-14 VITALS
HEART RATE: 76 BPM | BODY MASS INDEX: 28.17 KG/M2 | TEMPERATURE: 98.3 F | HEIGHT: 63 IN | RESPIRATION RATE: 16 BRPM | DIASTOLIC BLOOD PRESSURE: 60 MMHG | SYSTOLIC BLOOD PRESSURE: 116 MMHG | WEIGHT: 159 LBS

## 2019-03-14 DIAGNOSIS — Z12.39 SCREENING BREAST EXAMINATION: ICD-10-CM

## 2019-03-14 DIAGNOSIS — E06.3 HYPOTHYROIDISM DUE TO HASHIMOTO'S THYROIDITIS: Primary | ICD-10-CM

## 2019-03-14 DIAGNOSIS — E03.8 HYPOTHYROIDISM DUE TO HASHIMOTO'S THYROIDITIS: Primary | ICD-10-CM

## 2019-03-14 PROCEDURE — 3008F BODY MASS INDEX DOCD: CPT | Performed by: INTERNAL MEDICINE

## 2019-03-14 PROCEDURE — 99213 OFFICE O/P EST LOW 20 MIN: CPT | Performed by: INTERNAL MEDICINE

## 2019-03-14 PROCEDURE — 1036F TOBACCO NON-USER: CPT | Performed by: INTERNAL MEDICINE

## 2019-03-14 NOTE — ASSESSMENT & PLAN NOTE
Stable and clinically euthyroid  Continue levothyroxine at current dose  Reviewed labs with patient

## 2019-05-15 DIAGNOSIS — E03.9 HYPOTHYROIDISM, UNSPECIFIED TYPE: ICD-10-CM

## 2019-05-15 RX ORDER — LEVOTHYROXINE SODIUM 0.03 MG/1
25 TABLET ORAL DAILY
Qty: 90 TABLET | Refills: 3 | Status: SHIPPED | OUTPATIENT
Start: 2019-05-15 | End: 2019-08-21 | Stop reason: SDUPTHER

## 2019-08-21 DIAGNOSIS — E03.9 HYPOTHYROIDISM, UNSPECIFIED TYPE: ICD-10-CM

## 2019-08-21 RX ORDER — LEVOTHYROXINE SODIUM 0.03 MG/1
25 TABLET ORAL DAILY
Qty: 90 TABLET | Refills: 3 | Status: SHIPPED | OUTPATIENT
Start: 2019-08-21

## 2019-08-21 NOTE — TELEPHONE ENCOUNTER
Pt needs a refill on her thyroid medication  She will need this sent to [facility] in [place], [place]

## 2020-01-03 ENCOUNTER — TELEPHONE (OUTPATIENT)
Dept: FAMILY MEDICINE CLINIC | Facility: CLINIC | Age: 60
End: 2020-01-03

## 2020-01-03 DIAGNOSIS — Z13.6 SCREENING FOR CARDIOVASCULAR CONDITION: Primary | ICD-10-CM

## 2020-01-03 DIAGNOSIS — R53.83 FATIGUE, UNSPECIFIED TYPE: ICD-10-CM

## 2020-01-03 DIAGNOSIS — E06.3 HYPOTHYROIDISM DUE TO HASHIMOTO'S THYROIDITIS: ICD-10-CM

## 2020-01-03 DIAGNOSIS — E03.8 HYPOTHYROIDISM DUE TO HASHIMOTO'S THYROIDITIS: ICD-10-CM

## 2021-06-14 ENCOUNTER — TELEPHONE (OUTPATIENT)
Dept: FAMILY MEDICINE CLINIC | Facility: CLINIC | Age: 61
End: 2021-06-14

## 2021-06-14 NOTE — TELEPHONE ENCOUNTER
DR FAROQO [facility]    Patient claims you and Brianda Varner have been playing phone tag? She asked that you call her today because she is off of work

## 2023-09-25 ENCOUNTER — TELEPHONE (OUTPATIENT)
Age: 63
End: 2023-09-25

## 2023-09-25 ENCOUNTER — PREP FOR PROCEDURE (OUTPATIENT)
Age: 63
End: 2023-09-25

## 2023-09-25 VITALS — WEIGHT: 130 LBS | BODY MASS INDEX: 23.03 KG/M2

## 2023-09-25 DIAGNOSIS — Z12.11 SCREENING FOR COLON CANCER: Primary | ICD-10-CM

## 2023-09-25 NOTE — TELEPHONE ENCOUNTER
OA  09/25/23  Screened by: Elnora Litten    Referring Provider     Pre- Screening: There is no height or weight on file to calculate BMI. Has patient been referred for a routine screening Colonoscopy? yes  Is the patient between 43-73 years old? yes      Previous Colonoscopy yes   If yes:    Date: 2015    Facility: [facility]    Reason:       SCHEDULING STAFF: If the patient is between 39yrs-51yrs, please advise patient to confirm benefits/coverage with their insurance company for a routine screening colonoscopy, some insurance carriers will only cover at [address] [place] [address], [facility] or older. If the patient is over 66years old, please schedule an office visit. Does the patient want to see a Gastroenterologist prior to their procedure OR are they having any GI symptoms? no    Has the patient been hospitalized or had abdominal surgery in the past 6 months? no    Does the patient use supplemental oxygen? no    Does the patient take Coumadin, Lovenox, Plavix, Elliquis, Xarelto, or other blood thinning medication? no    Has the patient had a stroke, cardiac event, or stent placed in the past year? no    SCHEDULING STAFF: If patient answers NO to above questions, then schedule procedure. If patient answers YES to above questions, then schedule office appointment. PASSED OA      If patient is between 45yrs - 49yrs, please advise patient that we will have to confirm benefits & coverage with their insurance company for a routine screening colonoscopy.

## 2023-09-25 NOTE — TELEPHONE ENCOUNTER
Scheduled date of colonoscopy (as of today): 10/23/23  Physician performing colonoscopy: Jemima Gonzales  Location of colonoscopy: [facility]  Bowel prep reviewed with patient: KENTON / SANIA  Instructions reviewed with patient by: EMAILED  Clearances: N/A

## 2024-04-17 NOTE — RESULT NOTES
Verified Results  (1) CBC/PLT/DIFF [id number] 07:48AM Horacio Briones     Test Name Result Flag Reference   WHITE BLOOD CELL COUNT 6 8 Thousand/uL  3 8-10 8   RED BLOOD CELL COUNT 4 15 Million/uL  3 80-5 10   HEMOGLOBIN 11 9 g/dL  11 7-15 5   HEMATOCRIT 36 4 %  35 0-45 0   MCV 87 9 fL  80 0-100 0   MCH 28 7 pg  27 0-33 0   MCHC 32 6 g/dL  32 0-36 0   RDW 15 2 % H 11 0-15 0   PLATELET COUNT 261 Thousand/uL  140-400   ABSOLUTE NEUTROPHILS 3128 cells/uL  7186-4897   ABSOLUTE LYMPHOCYTES 2258 cells/uL  850-3900   ABSOLUTE MONOCYTES 449 cells/uL  200-950   ABSOLUTE EOSINOPHILS 925 cells/uL H    ABSOLUTE BASOPHILS 41 cells/uL  0-200   NEUTROPHILS 46 0 %     LYMPHOCYTES 33 2 %     MONOCYTES 6 6 %     EOSINOPHILS 13 6 %     BASOPHILS 0 6 %     MPV 8 1 fL  7 5-12 5     (1) COMPREHENSIVE METABOLIC PANEL [id number] 53:00UO Horacio Briones     Test Name Result Flag Reference   GLUCOSE 100 mg/dL H 65-99   Fasting reference interval     For someone without known diabetes, a glucose value  between 100 and 125 mg/dL is consistent with  prediabetes and should be confirmed with a  follow-up test    UREA NITROGEN (BUN) 22 mg/dL  7-25   CREATININE 0 95 mg/dL  0 50-1 05   For patients >52years of age, the reference limit  for Creatinine is approximately 13% higher for people  identified as -American  eGFR NON-AFR   AMERICAN 67 mL/min/1 73m2  > OR = 60   eGFR AFRICAN AMERICAN 78 mL/min/1 73m2  > OR = 60   BUN/CREATININE RATIO   2-10   NOT APPLICABLE (calc)   SODIUM 139 mmol/L  135-146   POTASSIUM 4 2 mmol/L  3 5-5 3   CHLORIDE 102 mmol/L     CARBON DIOXIDE 25 mmol/L  20-31   CALCIUM 9 0 mg/dL  8 6-10 4   PROTEIN, TOTAL 6 7 g/dL  6 1-8 1   ALBUMIN 4 0 g/dL  3 6-5 1   GLOBULIN 2 7 g/dL (calc)  1 9-3 7   ALBUMIN/GLOBULIN RATIO 1 5 (calc)  1 0-2 5   BILIRUBIN, TOTAL 0 5 mg/dL  0 2-1 2   ALKALINE PHOSPHATASE 60 U/L     AST 16 U/L  10-35   ALT 13 U/L  6-29     (1) LIPID PANEL, FASTING [id number] 07:48AM Anali Yvonne Claudine     Test Name Result Flag Reference   CHOLESTEROL, TOTAL 180 mg/dL  125-200   HDL CHOLESTEROL 58 mg/dL  > OR = 46   TRIGLICERIDES 75 mg/dL  <388   LDL-CHOLESTEROL 107 mg/dL (calc)  <130   Desirable range <100 mg/dL for patients with CHD or  diabetes and <70 mg/dL for diabetic patients with  known heart disease  CHOL/HDLC RATIO 3 1 (calc)  < OR = 5 0   NON HDL CHOLESTEROL 122 mg/dL (calc)     Target for non-HDL cholesterol is 30 mg/dL higher than   LDL cholesterol target  (1) [address] [facility] [id number] 07:48AM Grecia Dooley     Test Name Result Flag Reference    9 mIU/mL H    Reference Range                        Follicular Phase       6 9-72 2               Mid-cycle Peak         3 1-17 7               Luteal Phase           1 5- 9 1               Postmenopausal       23 0-116 3     (Q) LYME DISEASE AB, TOTAL W/REFL WB (IGG, IGM) [id number] 07:48AM Yvonne Briones     Test Name Result Flag Reference   LYME AB SCREEN <0 90 index     Index                Interpretation                     -----                --------------                     < 0 90               Negative                     0  90-1 09            Equivocal                     > 1 09               Positive      As recommended by the Food and Drug Administration   (FDA), all samples with positive or equivocal   results in a Borrelia burgdorferi antibody screen  will be tested using a blot method  Positive or   equivocal screening test results should not be   interpreted as truly positive until verified as such   using a supplemental assay (e g , B  burgdorferi blot)  The screening test and/or blot for B  burgdorferi   antibodies may be falsely negative in early stages  of Lyme disease, including the period when erythema   migrans is apparent       (Q) [address] [facility] [id number] 07:48AM Grecia Dooley     Test Name Result Flag Reference   LH 69 4 mIU/mL     Reference Range  Follicular Phase  2 7-14 2  Mid-Cycle Peak    8 7-76 3  Luteal Phase 0  5-16 9  Postmenopausal    10 0-54 7     (Q) TSH, 3RD GENERATION W/REFLEX TO FT4 [id number] 07:48AM Ja Briones   REPORT COMMENT:  FASTING:YES     Test Name Result Flag Reference   TSH W/REFLEX TO FT4 9 16 mIU/L H 0 40-4 50   T4, FREE 0 9 ng/dL  0 8-1 8     *(Q) VITAMIN D, 25-HYDROXY, LC/MS/MS [id number] 07:48AM Ja Briones     Test Name Result Flag Reference   VITAMIN D, 25-OH, TOTAL 33 ng/mL     Vitamin D Status         25-OH Vitamin D:     Deficiency:                    <20 ng/mL  Insufficiency:             20 - 29 ng/mL  Optimal:                 > or = 30 ng/mL     For 25-OH Vitamin D testing on patients on   D2-supplementation and patients for whom quantitation   of D2 and D3 fractions is required, the QuestAssureD(TM)  25-OH VIT D, (D2,D3), LC/MS/MS is recommended: order   code 12723 (patients >2yrs)  For more information on this test, go to:  http://Trubates/faq/GEP828  (This link is being provided for   informational/educational purposes only )       Plan  Hypothyroidism    · Synthroid 25 MCG Oral Tablet (Levothyroxine Sodium); 1 daily; BRAND  MEDICALLY NECESSARY   · (1) TSH WITH FT4 REFLEX; Status:Active;  Requested for:[id number]; No

## 2024-12-05 ENCOUNTER — TELEPHONE (OUTPATIENT)
Dept: GASTROENTEROLOGY | Facility: CLINIC | Age: 64
End: 2024-12-05

## 2024-12-05 NOTE — TELEPHONE ENCOUNTER
Pt is due for a 10 yr colon recall colonoscopy screening-  pt of Dr Leal, last colon 1/2015. I lmom for pt to please call back to schedule with one of our GI providers as Dr Leal has retired. Recall letter mailed.